# Patient Record
Sex: MALE | Race: AMERICAN INDIAN OR ALASKA NATIVE
[De-identification: names, ages, dates, MRNs, and addresses within clinical notes are randomized per-mention and may not be internally consistent; named-entity substitution may affect disease eponyms.]

---

## 2019-06-28 ENCOUNTER — HOSPITAL ENCOUNTER (INPATIENT)
Dept: HOSPITAL 5 - ED | Age: 69
LOS: 4 days | Discharge: HOME | DRG: 280 | End: 2019-07-02
Attending: INTERNAL MEDICINE | Admitting: INTERNAL MEDICINE
Payer: COMMERCIAL

## 2019-06-28 DIAGNOSIS — R91.8: ICD-10-CM

## 2019-06-28 DIAGNOSIS — Z71.6: ICD-10-CM

## 2019-06-28 DIAGNOSIS — I11.0: ICD-10-CM

## 2019-06-28 DIAGNOSIS — J96.00: ICD-10-CM

## 2019-06-28 DIAGNOSIS — I50.31: ICD-10-CM

## 2019-06-28 DIAGNOSIS — I25.10: ICD-10-CM

## 2019-06-28 DIAGNOSIS — I21.A1: Primary | ICD-10-CM

## 2019-06-28 DIAGNOSIS — L92.9: ICD-10-CM

## 2019-06-28 DIAGNOSIS — F17.213: ICD-10-CM

## 2019-06-28 DIAGNOSIS — I42.9: ICD-10-CM

## 2019-06-28 LAB
ALBUMIN SERPL-MCNC: 3.5 G/DL (ref 3.9–5)
ALT SERPL-CCNC: 16 UNITS/L (ref 7–56)
BASOPHILS # (AUTO): 0.1 K/MM3 (ref 0–0.1)
BASOPHILS NFR BLD AUTO: 0.7 % (ref 0–1.8)
BUN SERPL-MCNC: 13 MG/DL (ref 9–20)
BUN/CREAT SERPL: 13 %
CALCIUM SERPL-MCNC: 9.2 MG/DL (ref 8.4–10.2)
EOSINOPHIL # BLD AUTO: 0.1 K/MM3 (ref 0–0.4)
EOSINOPHIL NFR BLD AUTO: 0.7 % (ref 0–4.3)
HCT VFR BLD CALC: 36.1 % (ref 35.5–45.6)
HDLC SERPL-MCNC: 42 MG/DL (ref 40–59)
HEMOLYSIS INDEX: 4
HGB BLD-MCNC: 12.1 GM/DL (ref 11.8–15.2)
LYMPHOCYTES # BLD AUTO: 2.1 K/MM3 (ref 1.2–5.4)
LYMPHOCYTES NFR BLD AUTO: 22.8 % (ref 13.4–35)
MCHC RBC AUTO-ENTMCNC: 34 % (ref 32–34)
MCV RBC AUTO: 86 FL (ref 84–94)
MONOCYTES # (AUTO): 0.7 K/MM3 (ref 0–0.8)
MONOCYTES % (AUTO): 7.7 % (ref 0–7.3)
PLATELET # BLD: 483 K/MM3 (ref 140–440)
RBC # BLD AUTO: 4.2 M/MM3 (ref 3.65–5.03)

## 2019-06-28 PROCEDURE — 93005 ELECTROCARDIOGRAM TRACING: CPT

## 2019-06-28 PROCEDURE — 85610 PROTHROMBIN TIME: CPT

## 2019-06-28 PROCEDURE — 85520 HEPARIN ASSAY: CPT

## 2019-06-28 PROCEDURE — 85379 FIBRIN DEGRADATION QUANT: CPT

## 2019-06-28 PROCEDURE — 85049 AUTOMATED PLATELET COUNT: CPT

## 2019-06-28 PROCEDURE — 83880 ASSAY OF NATRIURETIC PEPTIDE: CPT

## 2019-06-28 PROCEDURE — 85014 HEMATOCRIT: CPT

## 2019-06-28 PROCEDURE — 71275 CT ANGIOGRAPHY CHEST: CPT

## 2019-06-28 PROCEDURE — 85027 COMPLETE CBC AUTOMATED: CPT

## 2019-06-28 PROCEDURE — 96374 THER/PROPH/DIAG INJ IV PUSH: CPT

## 2019-06-28 PROCEDURE — 85025 COMPLETE CBC W/AUTO DIFF WBC: CPT

## 2019-06-28 PROCEDURE — 84484 ASSAY OF TROPONIN QUANT: CPT

## 2019-06-28 PROCEDURE — 82550 ASSAY OF CK (CPK): CPT

## 2019-06-28 PROCEDURE — 80048 BASIC METABOLIC PNL TOTAL CA: CPT

## 2019-06-28 PROCEDURE — 80061 LIPID PANEL: CPT

## 2019-06-28 PROCEDURE — 71045 X-RAY EXAM CHEST 1 VIEW: CPT

## 2019-06-28 PROCEDURE — 96375 TX/PRO/DX INJ NEW DRUG ADDON: CPT

## 2019-06-28 PROCEDURE — 94760 N-INVAS EAR/PLS OXIMETRY 1: CPT

## 2019-06-28 PROCEDURE — 82553 CREATINE MB FRACTION: CPT

## 2019-06-28 PROCEDURE — 93010 ELECTROCARDIOGRAM REPORT: CPT

## 2019-06-28 PROCEDURE — 93970 EXTREMITY STUDY: CPT

## 2019-06-28 PROCEDURE — 85730 THROMBOPLASTIN TIME PARTIAL: CPT

## 2019-06-28 PROCEDURE — 80053 COMPREHEN METABOLIC PANEL: CPT

## 2019-06-28 PROCEDURE — 93306 TTE W/DOPPLER COMPLETE: CPT

## 2019-06-28 PROCEDURE — 36415 COLL VENOUS BLD VENIPUNCTURE: CPT

## 2019-06-28 PROCEDURE — 85018 HEMOGLOBIN: CPT

## 2019-06-28 NOTE — EMERGENCY DEPARTMENT REPORT
ED Shortness of Breath HPI





- General


Chief Complaint: Chest Pain


Stated Complaint: CHEST PAIN/SOB


Time Seen by Provider: 06/28/19 20:04


Source: family


Mode of arrival: Wheelchair


Limitations: Language Barrier





- History of Present Illness


Initial Comments: 





Mr. Olmstead is a 68 yo male with hx of HTN who presents with 2 months of dyspnea 

with exertion.  Recently arrived from [place] last night to visit his son.  He 

lives in [facility].  No leg pain.  +tobacco abuse.  Does not take medication for 

HTN.  He works as a farmer.  No chest pain currently  has mild nonspecific chest

pain transiently, too mild to describe.





No dyspnea at rest.  No cough.  


MD Complaint: shortness of breath


-: Gradual, month(s) (2)


Severity: mild


Consistency: constant


Improves With: rest


Worsens With: exertion


Known History Of: other (HTN o/w healthy)


Associated Symptoms: chest pain


Treatments Prior to Arrival: none





- Related Data


                                    Allergies











Allergy/AdvReac Type Severity Reaction Status Date / Time


 


No Known Allergies Allergy   Unverified 06/28/19 14:40














ED Review of Systems


ROS: 


Stated complaint: CHEST PAIN/SOB


Other details as noted in HPI





Comment: All other systems reviewed and negative


Constitutional: denies: fever, malaise


Respiratory: shortness of breath.  denies: cough


Cardiovascular: chest pain





ED Past Medical Hx





- Past Medical History


Previous Medical History?: Yes


Hx Hypertension: Yes





- Surgical History


Past Surgical History?: Yes


Additional Surgical History: hernia





- Social History


Smoking Status: Current Every Day Smoker





ED Physical Exam





- General


Limitations: Language Barrier


General appearance: alert, in no apparent distress





- Head


Head exam: Present: atraumatic, normocephalic





- Eye


Eye exam: Present: normal appearance





- ENT


ENT exam: Present: mucous membranes moist





- Neck


Neck exam: Present: normal inspection, full ROM





- Respiratory


Respiratory exam: Present: normal lung sounds bilaterally.  Absent: respiratory 

distress, wheezes, rales, rhonchi





- Cardiovascular


Cardiovascular Exam: Present: regular rate, normal rhythm, normal heart sounds. 

Absent: systolic murmur, diastolic murmur, rubs, gallop





- GI/Abdominal


GI/Abdominal exam: Present: soft, normal bowel sounds.  Absent: distended, 

tenderness, guarding, rebound





- Rectal


Rectal exam: Present: deferred





- Extremities Exam


Extremities exam: Present: normal inspection





- Back Exam


Back exam: Present: normal inspection





- Neurological Exam


Neurological exam: Present: alert, oriented X3





- Psychiatric


Psychiatric exam: Present: normal affect, normal mood





- Skin


Skin exam: Present: warm, dry, intact, normal color.  Absent: rash





ED Course


                                   Vital Signs











  06/28/19 06/28/19 06/28/19





  14:48 20:26 20:30


 


Temperature 98.1 F  


 


Pulse Rate 95 H 104 H 105 H


 


Respiratory 20 26 H 21





Rate   


 


Blood Pressure   174/124


 


Blood Pressure 150/104  





[Left]   


 


O2 Sat by Pulse 95  96





Oximetry   














  06/28/19 06/28/19 06/28/19





  20:39 20:45 20:59


 


Temperature   98.1 F


 


Pulse Rate 104 H 100 H 


 


Respiratory 24 21 





Rate   


 


Blood Pressure  168/116 


 


Blood Pressure 174/124  





[Left]   


 


O2 Sat by Pulse   





Oximetry   














ED Medical Decision Making





- Lab Data


Result diagrams: 


                                 06/28/19 20:38





                                 06/28/19 20:38





- EKG Data





06/28/19 20:56


EKG obtained 2048


Sinus tachycardia rate 100 beats a minute right axis deviation prolonged QT 

interval no significant ST elevation no acute ischemia 





- Radiology Data


Radiology results: report reviewed





AP portable chest: Perihilar basilar congestion, small bilateral effusion 

possible infiltrate in the left base according to radiology report and my 

personal interpretation





CT angiogram of the chest revealed no evidence of pulmonary embolism, positive 

atherosclerosis  of the coronary arteries positive hilar or mediastinal 

adenopathy, bilateral pleural effusions, pulmonary edema, soft tissue mass at 

the apex of the lung





- Medical Decision Making





1.  Shortness of breath especially with exertion for the past 2 months: Findings

 of acute congestive heart failure





2.  Elevated troponin reflective of cardiomyopathy, no current chest pain at 

this time.  Do not suspect unstable angina.  Will need further cardiac 

evaluation.





3.  Lung mass, will need further outpatient or inpatient treatment


Critical care attestation.: 


If time is entered above; I have spent that time in minutes in the direct care 

of this critically ill patient, excluding procedure time.








ED Disposition


Clinical Impression: 


 Acute congestive heart failure, Cardiomyopathy, Coronary artery disease, Lung 

mass





Disposition: DC-09 OP ADMIT IP TO THIS HOSP


Is pt being admited?: Yes


Does the pt Need Aspirin: No


Condition: Stable

## 2019-06-28 NOTE — XRAY REPORT
PROCEDURE: XR CHEST 1V AP 

 

TECHNIQUE:  Chest radiograph single view.  

 

HISTORY: Dyspnea 

 

COMPARISONS: None . 

 

FINDINGS: 

 

Heart: Borderline prominent in view of the projection. 

Mediastinum/Vessels: Aorta is unfolded, likely age-related. 

Lungs/Pleural space:  Perihilar vascular congestion is noted. Small bilateral pleural effusions are p
resent. Slightly greater consolidation in the left base is noted. I cannot exclude underlying infiltr
ate. 

Bony thorax: No acute osseous abnormality. 

Life support devices: None. 

 

IMPRESSION: Perihilar vascular congestion present bilaterally. Small bilateral effusions. Possible in
filtrate in the left base 

 

This document is electronically signed by Beulah Meeks MD., June 28 2019 09:35:53 PM ET

## 2019-06-28 NOTE — CAT SCAN REPORT
PROCEDURE: CT ANGIO CHEST 

 

TECHNIQUE:  Computerized tomographic angiography of the chest was performed during the IV injection o
f iodinated nonionic contrast including image processing.  The image data was postprocessed using 2-d
imensional multiplanar reformatted (MPR) and 3-dimensional (MIP and/or volume rendered) techniques. A
utomated exposure control, adjustment of mA and/or kV according to patient size, or iterative reconst
ruction dose optimization techniques were utilized.  

 

CT DOSE LENGTH PRODUCT:  363.7  mGycm 

 

HISTORY: tachycardia right bundle-branch block dyspnea . Evaluate pulmonary embolus 

 

COMPARISONS:  None . 

 

FINDINGS: 

 

Pulmonary out flow tract, right and left main pulmonary arteries and the approximal branches:  Clear,
 no filling defects seen to suggest pulmonary embolus. 

 

Pericardium: No evidence of pericardial effusion. 

 

 

Thoracic aorta:  No evidence of aneurysmal dilatation or dissection. 

 

 

Coronary arteries: Partially calcified indicating atherosclerotic disease. 

 

Mediastinum and hilar regions: Lymph nodes measuring up to 2.2 x 1.2 cm visualized in the right hilum
. Infracarinal adenopathy also appears to be present. This appears matted. Additional adenopathy prev
iously visualized posterior to the left mainstem bronchus measuring up to 1.4 cm in thickness. 

 

Lung Fields: Moderate size right pleural effusion present, small left pleural effusion is present. In
terstitial markings diffusely coarsened. There is patchy groundglass density in the perihilar regions
. The appearance suggests pulmonary edema/CHF. Some of the fluid on the right is tracking into the ma
tom fissure. 

 

There is a spiculated soft tissue mass in the apex of the right lung. This is partially calcified and
 measures 2.1 x 1.7 x 2.6 cm.. This extends to the right pleural surface at the apex of the right shavon
g. A smaller mildly spiculated mass visualized in the apex of the left lung measuring 1.8 x 1.5 cm. T
his appears to extends to the pleural surface near the apex of the right lung. 

 

Upper abdomen:  Contrast is seen refluxing into the inferior vena cava and intrahepatic veins.. This 
can be seen with tricuspid insufficiency and congestive failure. 

 

5 cm low-density nodule visualized in the renal cortex lower third of the left kidney suggesting a re
nal cortical cyst. 

 

Other: No acute bone abnormalities are identified. 

 

IMPRESSION: 

 

No evidence of pulmonary embolus. 

 

Atherosclerosis coronary arteries. 

 

Hilar and mediastinal adenopathy as described. 

 

Moderate size right pleural effusion present, small left pleural effusion present. 

 

Interstitial markings diffusely coarsened and there is patchy groundglass density in the perihilar re
gions. The appearance suggests pulmonary edema/CHF. 

 

Spiculated soft tissue mass near the apex of the right and left lung, the right is partially calcifie
d. These extend superiorly and are contiguous with the apical pleural surface. Pleural and parenchyma
l scarring could present this pattern however the nodular appearance is worrisome for malignancy. PET
 scan may be helpful to evaluate this patient further. 

 

 

 

 

This document is electronically signed by Archie Michelle MD., June 28 2019 11:05:57 PM ET

## 2019-06-29 LAB
APTT BLD: 33.7 SEC. (ref 24.2–36.6)
HCT VFR BLD CALC: 37.8 % (ref 35.5–45.6)
HGB BLD-MCNC: 12.7 GM/DL (ref 11.8–15.2)
INR PPP: 1.21 (ref 0.87–1.13)
PLATELET # BLD: 528 K/MM3 (ref 140–440)

## 2019-06-29 RX ADMIN — FUROSEMIDE SCH MG: 10 INJECTION, SOLUTION INTRAVENOUS at 09:33

## 2019-06-29 RX ADMIN — HEPARIN SODIUM SCH MLS/HR: 5000 INJECTION, SOLUTION INTRAVENOUS at 16:43

## 2019-06-29 RX ADMIN — CARVEDILOL SCH MG: 3.12 TABLET, FILM COATED ORAL at 09:34

## 2019-06-29 RX ADMIN — CARVEDILOL SCH MG: 3.12 TABLET, FILM COATED ORAL at 22:23

## 2019-06-29 RX ADMIN — NITROGLYCERIN SCH INCH: 20 OINTMENT TOPICAL at 09:35

## 2019-06-29 RX ADMIN — ASPIRIN SCH MG: 325 TABLET ORAL at 09:34

## 2019-06-29 RX ADMIN — NITROGLYCERIN SCH INCH: 20 OINTMENT TOPICAL at 05:30

## 2019-06-29 RX ADMIN — LISINOPRIL SCH MG: 5 TABLET ORAL at 09:34

## 2019-06-29 RX ADMIN — HEPARIN SODIUM SCH MLS/HR: 5000 INJECTION, SOLUTION INTRAVENOUS at 08:40

## 2019-06-29 RX ADMIN — NITROGLYCERIN SCH INCH: 20 OINTMENT TOPICAL at 18:38

## 2019-06-29 RX ADMIN — NITROGLYCERIN SCH INCH: 20 OINTMENT TOPICAL at 13:46

## 2019-06-29 NOTE — CONSULTATION
History of Present Illness


Consult date: 06/29/19


Requesting physician: HEMANTH PLUMMER


Consult reason: congestive heart failure, elevated troponin


History of present illness: 





Mr. Olmstead is a 68 y/o  with a documented history of hypertension

who presented to the ED with BRITTON and chest pain x2 months.  He recently arrived 

from [facility] to visit his son.  He takes no medication and reports no other 

medical history.  CTA of the chest was negative for PE and other acute findings.

 CXR significant for vascular congestion, small bilateral effusions and a pos

sible infiltrate in the left base. Troponins .32 and .273.  BNP also elevated to

8370.  EKG negative for acute findings. 





Past History


Past Medical History: hypertension





Medications and Allergies


                                    Allergies











Allergy/AdvReac Type Severity Reaction Status Date / Time


 


No Known Allergies Allergy   Unverified 06/28/19 14:40











Active Meds: 


Active Medications





Aspirin (Aspirin)  325 mg PO QDAY [facility]


   Last Admin: 06/29/19 09:34 Dose:  325 mg


   Documented by: 


Carvedilol (Coreg)  3.125 mg PO BID [facility]


   Last Admin: 06/29/19 09:34 Dose:  3.125 mg


   Documented by: 


Furosemide (Lasix)  40 mg IV QDAY [facility]


   Last Admin: 06/29/19 09:33 Dose:  40 mg


   Documented by: 


Hydralazine HCl (Apresoline)  10 mg IV Q4H PRN


   PRN Reason: Hypertension


Heparin Sodium/Sodium Chloride (Heparin/ 0.45% Nacl-25,000 Unit/500 Ml)  25,000 

unit in 500 mls @ 20 mls/hr IV TITRATE [facility]; Protocol


   Last Admin: 06/29/19 08:40 Dose:  1,000 units/hr, 20 mls/hr


   Documented by: 


Levalbuterol HCl (Xopenex)  0.63 mg IH Q8HRT PRN


   PRN Reason: Shortness Of Breath


Lisinopril (Zestril)  5 mg PO DAILY [facility]


   Last Admin: 06/29/19 09:34 Dose:  5 mg


   Documented by: 


Morphine Sulfate (Morphine)  2 mg IV Q6H PRN


   PRN Reason: Pain, Moderate (4-6)


Nitroglycerin (Nitro-Bid 2%)  0.5 inch TP QIDNTG [facility]; Protocol


   Last Admin: 06/29/19 09:35 Dose:  0.5 inch


   Documented by: 


Ondansetron HCl (Zofran)  4 mg IV Q8H PRN


   PRN Reason: Nausea And Vomiting











Review of Systems


ROS unobtainable: due to mental status (Patient does not speak English.  No 

 or family available. )


Cardiovascular: chest pain





Physical Examination


                                   Vital Signs











Temp Pulse Resp BP Pulse Ox


 


 98.1 F   95 H  20   150/104   95 


 


 06/28/19 14:48  06/28/19 14:48  06/28/19 14:48  06/28/19 14:48  06/28/19 14:48











General appearance: no acute distress


HEENT: Positive: PERRL


Neck: Positive: neck supple


Cardiac: Positive: Reg Rate and Rhythm


Lungs: Positive: clear to auscultation, Normal Breath Sounds, Decreased Breath 

Sounds (Diminished on left)


Neuro: Positive: Other (Deferred)


Abdomen: Positive: Unremarkable


Male genitourinary: Positive: deferred


Skin: Positive: Clear


Musculoskeletal: Normal Range of Motion


Extremities: Present: normal





Results





                                 06/29/19 07:43





                                 06/28/19 20:38


                                 Cardiac Enzymes











  06/28/19 06/29/19 Range/Units





  20:38 05:16 


 


AST  24   (5-40)  units/L


 


CK-MB (CK-2)   9.2 H  (0.0-4.0)  ng/mL








                                   Coagulation











  06/29/19 Range/Units





  07:43 


 


PT  15.0 H  (12.2-14.9)  Sec.


 


INR  1.21 H  (0.87-1.13)  


 


APTT  33.7  (24.2-36.6)  Sec.








                                     Lipids











  06/28/19 Range/Units





  20:38 


 


Triglycerides  82  (2-149)  mg/dL


 


Cholesterol  150  ()  mg/dL


 


HDL Cholesterol  42  (40-59)  mg/dL


 


Cholesterol/HDL Ratio  3.57  %








                                       CBC











  06/28/19 06/29/19 Range/Units





  20:38 07:43 


 


WBC  9.2   (4.5-11.0)  K/mm3


 


RBC  4.20   (3.65-5.03)  M/mm3


 


Hgb  12.1  12.7  (11.8-15.2)  gm/dl


 


Hct  36.1  37.8  (35.5-45.6)  %


 


Plt Count  483 H  528 H  (140-440)  K/mm3


 


Lymph #  2.1   (1.2-5.4)  K/mm3


 


Mono #  0.7   (0.0-0.8)  K/mm3


 


Eos #  0.1   (0.0-0.4)  K/mm3


 


Baso #  0.1   (0.0-0.1)  K/mm3








                          Comprehensive Metabolic Panel











  06/28/19 Range/Units





  20:38 


 


Sodium  137  (137-145)  mmol/L


 


Potassium  4.0  (3.6-5.0)  mmol/L


 


Chloride  97.6 L  ()  mmol/L


 


Carbon Dioxide  24  (22-30)  mmol/L


 


BUN  13  (9-20)  mg/dL


 


Creatinine  1.0  (0.8-1.5)  mg/dL


 


Glucose  99  ()  mg/dL


 


Calcium  9.2  (8.4-10.2)  mg/dL


 


AST  24  (5-40)  units/L


 


ALT  16  (7-56)  units/L


 


Alkaline Phosphatase  77  ()  units/L


 


Total Protein  7.4  (6.3-8.2)  g/dL


 


Albumin  3.5 L  (3.9-5)  g/dL














- Imaging and Cardiology


Echo: pending





EKG interpretations





- Telemetry


EKG Rhythm: Sinus Rhythm





- EKG


Supraventricular dysrhythmia: atrial premature complexe


AV and intraventricular conduction: left bundle branch block, left posterior 

fascicular


Repolarization changes or abnormalities: nonspecific abnormality, ST segment, 

and/or T wave





Assessment and Plan





Chest pain of unclear etiology at this point.  Will obtain echocardiogram, which

will guide further recommendations.  Agree with current cardiac management, 

which includes nitroglycerin, carvedilol, aspirin, Lasix and lisinopril.  





Patient has been seen in conjunction with Dr. Cr, who agrees with 

assessment and plan.











- Patient Problems


(1) NSTEMI (non-ST elevated myocardial infarction)


Current Visit: Yes   Status: Acute   





(2) Hypertension


Current Visit: Yes   Status: Chronic   





(3) Acute congestive heart failure


Current Visit: Yes   Status: Suspected   





(4) Cardiomyopathy


Current Visit: Yes   Status: Suspected   





(5) Coronary artery disease


Current Visit: Yes   Status: Suspected   





(6) Lung mass


Current Visit: Yes   Status: Suspected

## 2019-06-29 NOTE — VASCULAR LAB REPORT
PROCEDURE:  VL VENOUS DUPLEX LE BILAT 

 

TECHNIQUE:  Grayscale, color flow and spectral waveform images were obtained of bilateral lower extre
mities. 

 

HISTORY:  dvt 

 

COMPARISON: None 

 

FINDINGS: 

 

There is no deep venous thrombosis seen in the left or right lower extremity. 

Flow is demonstrated by color flow and spectral waveform imaging. 

There is appropriate wall compression and augmentation. 

 

 

 

IMPRESSION: 

There is no evidence for DVT in either right or left lower extremity. 

 

This document is electronically signed by Rupa Caballero MD., June 29 2019 02:53:42 PM ET

## 2019-06-29 NOTE — HISTORY AND PHYSICAL REPORT
CHIEF COMPLAINT:  Shortness of breath.



HISTORY OF PRESENT ILLNESS:  The patient is a 69-year-old who presented to the

Emergency Room with history of shortness of breath going on for about 2 months. 

The patient recently came back from a trip to [place] after visiting his son who

lives in [facility] and states that he hardly walked about 5 to 6 steps without

being so short of breath.  Also, the patient complains of weakness whenever he

does a little activity.  The patient denied history of fever or chills and

denied history of cough.  Also, the patient denied history of swelling in the

ankles and the patient has history of chest pain and denied history of nausea or

vomiting.



PAST MEDICAL HISTORY:  Pertinent for hypertension.



PAST SURGICAL HISTORY:  Pertinent for hernia repair.



FAMILY HISTORY:  Reviewed and noncontributory.



SOCIAL HISTORY:  The patient smokes cigarettes, does not use illicit, and it is

not clear whether the patient drinks alcohol because the patient is a poor

historian and has communication problems.



MEDICATIONS:  The patient's home medications are not known.



ALLERGIES:  There are no known drug allergies.



REVIEW OF SYSTEMS:

CONSTITUTIONAL:  There is no fever, no chills, no diaphoresis.

HEENT:  There is no headache or sore throat.

CARDIOVASCULAR SYSTEM:  There is no chest pain.  There is no orthopnea.

RESPIRATORY SYSTEM:  Shortness of breath is present.  There is no cough.

GASTROINTESTINAL SYSTEM:  There is no nausea, no vomiting, no abdominal pain,

diarrhea or constipation.

NEUROLOGICAL SYSTEM:  There is generalized weakness, but no numbness, no

dizziness, and no altered mental status.

MUSCULOSKELETAL SYSTEM:  There is no joint pain or swelling.

DERMATOLOGICAL SYSTEM:  There is no skin rash or itching.

GENITOURINARY SYSTEM:  There is no dysuria, hematuria, or flank pain.

Rest of system review is normal.



PHYSICAL EXAMINATION:

GENERAL:  At the time of exam, the patient was found to be alert, oriented x 3,

in mild distress due to shortness of breath.

VITAL SIGNS:  Shows temperature of 98.1 degrees fahrenheit, pulse of 100,

respirations 21, blood pressure was 168/116 and O2 sat of 96% on room air.

HEENT:  Showed pupils to be equal, round, reactive to light and accommodation. 

Extraocular muscles are intact.

NECK:  Supple with no JVD or carotid bruit.

CARDIOVASCULAR SYSTEM:  Showed normal first and second heart sounds with no

gallops or murmurs.

RESPIRATORY SYSTEM:  Show good air entry on both sides of the lung with

bilateral scattered crackles.

GASTROINTESTINAL SYSTEM:  Show abdomen to be full, soft, and nontender with no

organomegaly or rigidity.

NEUROLOGICAL:  Shows no focal deficit.

MUSCULOSKELETAL SYSTEM:  Show no joint swelling or tenderness.

DERMATOLOGICAL SYSTEM:  Show no skin rash.

GENITOURINARY SYSTEM:  Showing no costovertebral angle tenderness.



PERTINENT LABORATORY AND IMAGING STUDIES:  The patient has the following lab

tests done.  CBC shows normal white count, normal hemoglobin, and normal

hematocrit with CBC differential being showing elevated monocyte count of 7.7%. 

The patient's D-dimer was elevated with a value of 868.  The patient's chemistry

was unremarkable except for elevated brain natriuretic peptide level of 8,832. 

The patient's cardiac enzymes show elevated troponin level of 0.321.



IMAGING STUDIES:  The patient had chest x-ray done that shows perihilar vascular

congestion bilaterally, small bilateral pleural effusion and possible infiltrate

in the left base of the lung.  The patient had CT angiogram of the chest done

because of elevated D-dimer and shortness of breath.  The CT angiogram shows no

evidence of pulmonary embolism.  There is finding of atherosclerotic coronary

arteries.  There is also finding of hilar and mediastinal adenopathy, moderate

sized right pleural effusion as well as small left pleural effusion were found. 

There is finding of interstitial marking diffusely ____ and there is patchy

ground glass density in the perihilar regions.  Radiology said that appearance

suggest pulmonary edema/congestive heart failure.  There is also finding of

spiculated soft tissue mass near the apex of the right and left lung and the

right spiculated mass is partially calcified.  The radiologist said that these

extends superiorly and that contiguous with the apical pleural surface.  The

report also shows that pleural and parenchymal scarring present these patterns,

however, radiologist is going further to say that nodular appearance is

worrisome for malignancy and suggested that PET scan may be helpful to evaluate

this patient.



DIAGNOSES

1.  Bilateral pleural effusion.

2.  Bilateral lung mass.

3.  Congestive heart failure.

4.  NSTEMI.

5.  Hypertension.



PLAN OF CARE:

1.  The patient will be admitted as inpatient to telemetry.

2.  The patient will have cardiac enzyme involving troponin, total CK and CK-MB

checked every 6 hours x 2 more levels.

3.  The patient will have Pulmonary consult with Dr.Reyes -Beltram for bilateral
lung

mass suspicious for malignancy and also bilateral pleural effusion.

4.  The patient will have cardiology consult with Dr. Bowling because of

NSTEMI  with congestive heart failure.

5.  The patient will be on aspirin 325 mg by mouth daily and will be on

carvedilol 3.125 mg twice daily for treatment of congestive heart failure.

6.  The patient will be on Lasix 40 mg IV daily and will be on nitro paste half

inch to anterior chest q.i.d.

7.  The patient will be on Xopenex breathing treatment every 8 hours as needed

for shortness of breath and will be on IV Zofran 4 mg every 8 hours for nausea

and vomiting as well as aspirin 325 mg by mouth daily.

8.  The patient will be on heparin 5000 units subcutaneous q. 12 hours for DVT

prophylaxis.

9.  The patient will have 2D echo done in the morning.

10.  The patient's diet will be 2 g sodium diet.

11.  The patient's blood pressure will be monitored with nitro paste and

lisinopril.  If the patient's blood pressure continues to be high, the patient

will be started on hydralazine as needed for elevated blood pressure.

12. Patient will be on I>V heparin per protocol for NSTEMI.





DD: 06/29/2019 01:29

DT: 06/29/2019 02:32

JOB# [id number]  [id number]

OCN/RIAN PALUMBOD

## 2019-06-29 NOTE — EVENT NOTE
Date: 06/29/19





Full consult to follow. Reviewed CT imaging.  Right upper lobe mass has calcium 

in it.  It is spiculated in nature and located in the upper lobe.  There is a 

smaller one located on the left upper lobe as well.  Adenopathy could be 

reactive or engorgement from pulmonary edema which is present.  Also bilateral 

effusions right >left.  Suggest once diuresed to order a CT guided biopsy of the

right upper lobe mass vs right sided thoracentesis but patient will need to be 

optimized volume wise first to have biopsy if needed.  Will discuss with family 

when I see them.

## 2019-06-29 NOTE — PROGRESS NOTE
Hospitalist Physical





- Constitutional


Vitals: 


                                        











Temp Pulse Resp BP Pulse Ox


 


 98.0 F   98 H  24   141/98   97 


 


 06/29/19 07:22  06/29/19 09:35  06/29/19 07:22  06/29/19 09:35  06/29/19 07:22














Results





- Labs


CBC & Chem 7: 


                                 06/29/19 07:43





                                 06/28/19 20:38


Labs: 


                             Laboratory Last Values











WBC  9.2 K/mm3 (4.5-11.0)   06/28/19  20:38    


 


RBC  4.20 M/mm3 (3.65-5.03)   06/28/19  20:38    


 


Hgb  12.7 gm/dl (11.8-15.2)   06/29/19  07:43    


 


Hct  37.8 % (35.5-45.6)   06/29/19  07:43    


 


MCV  86 fl (84-94)   06/28/19  20:38    


 


MCH  29 pg (28-32)   06/28/19  20:38    


 


MCHC  34 % (32-34)   06/28/19  20:38    


 


RDW  17.2 % (13.2-15.2)  H  06/28/19  20:38    


 


Plt Count  528 K/mm3 (140-440)  H  06/29/19  07:43    


 


Lymph % (Auto)  22.8 % (13.4-35.0)   06/28/19  20:38    


 


Mono % (Auto)  7.7 % (0.0-7.3)  H  06/28/19  20:38    


 


Eos % (Auto)  0.7 % (0.0-4.3)   06/28/19  20:38    


 


Baso % (Auto)  0.7 % (0.0-1.8)   06/28/19  20:38    


 


Lymph #  2.1 K/mm3 (1.2-5.4)   06/28/19  20:38    


 


Mono #  0.7 K/mm3 (0.0-0.8)   06/28/19  20:38    


 


Eos #  0.1 K/mm3 (0.0-0.4)   06/28/19  20:38    


 


Baso #  0.1 K/mm3 (0.0-0.1)   06/28/19  20:38    


 


Seg Neutrophils %  68.1 % (40.0-70.0)   06/28/19  20:38    


 


Seg Neutrophils #  6.2 K/mm3 (1.8-7.7)   06/28/19  20:38    


 


PT  15.0 Sec. (12.2-14.9)  H  06/29/19  07:43    


 


INR  1.21  (0.87-1.13)  H  06/29/19  07:43    


 


APTT  33.7 Sec. (24.2-36.6)   06/29/19  07:43    


 


  868.67 ng/mlDDU (0-234)  H  06/28/19  20:38    


 


Sodium  137 mmol/L (137-145)   06/28/19  20:38    


 


Potassium  4.0 mmol/L (3.6-5.0)   06/28/19  20:38    


 


Chloride  97.6 mmol/L ()  L  06/28/19  20:38    


 


Carbon Dioxide  24 mmol/L (22-30)   06/28/19  20:38    


 


  19 mmol/L  06/28/19  20:38    


 


BUN  13 mg/dL (9-20)   06/28/19  20:38    


 


  1.0 mg/dL (0.8-1.5)   06/28/19  20:38    


 


Estimated GFR  > 60 ml/min  06/28/19  20:38    


 


  13 %  06/28/19  20:38    


 


Glucose  99 mg/dL ()   06/28/19  20:38    


 


Calcium  9.2 mg/dL (8.4-10.2)   06/28/19  20:38    


 


  0.90 mg/dL (0.1-1.2)   06/28/19  20:38    


 


AST  24 units/L (5-40)   06/28/19  20:38    


 


ALT  16 units/L (7-56)   06/28/19  20:38    


 


  77 units/L ()   06/28/19  20:38    


 


  336 units/L ()  H  06/29/19  05:16    


 


CK-MB (CK-2)  9.2 ng/mL (0.0-4.0)  H  06/29/19  05:16    


 


CK-MB (CK-2) Rel Index  2.7  (0-4)   06/29/19  05:16    


 


  0.273 ng/mL (0.00-0.029)  H*  06/29/19  05:16    


 


NT-Pro-B Natriuret Pep  8832 pg/mL (0-900)  H  06/28/19  20:38    


 


  7.4 g/dL (6.3-8.2)   06/28/19  20:38    


 


  3.5 g/dL (3.9-5)  L  06/28/19  20:38    


 


  0.9 %  06/28/19  20:38    


 


Triglycerides  82 mg/dL (2-149)   06/28/19  20:38    


 


Cholesterol  150 mg/dL ()   06/28/19  20:38    


 


  101 mg/dL ()   06/28/19  20:38    


 


  42 mg/dL (40-59)   06/28/19  20:38    


 


  3.57 %  06/28/19  20:38    














Active Medications





- Current Medications


Current Medications: 














Generic Name Dose Route Start Last Admin





  Trade Name Freq  PRN Reason Stop Dose Admin


 


Aspirin  325 mg  06/29/19 10:00  06/29/19 09:34





  Aspirin  PO   325 mg





  QDAY JOSEFA   Administration


 


Carvedilol  3.125 mg  06/29/19 10:00  06/29/19 09:34





  Coreg  PO   3.125 mg





  BID JOSEFA   Administration


 


Furosemide  40 mg  06/29/19 10:00  06/29/19 09:33





  Lasix  IV   40 mg





  QDAY JOSEFA   Administration


 


Hydralazine HCl  10 mg  06/29/19 07:59 





  Apresoline  IV  





  Q4H PRN  





  Hypertension  


 


Heparin Sodium/Sodium Chloride  25,000 unit in 500 mls @ 20 mls/hr  06/29/19 

08:00  06/29/19 08:40





  Heparin/ 0.45% Nacl-25,000 Unit/500 Ml  IV   1,000 units/hr





  TITRATE JOSEFA   20 mls/hr





    Administration





  Protocol  





  1,000 UNITS/HR  


 


Levalbuterol HCl  0.63 mg  06/29/19 00:33 





  Xopenex  IH  





  Q8HRT PRN  





  Shortness Of Breath  


 


Lisinopril  5 mg  06/29/19 10:00  06/29/19 09:34





  Zestril  PO   5 mg





  DAILY JOSEFA   Administration


 


Nitroglycerin  0.5 inch  06/29/19 06:00  06/29/19 09:35





  Nitro-Bid 2%  TP   0.5 inch





  QIDNTG JOSEFA   Administration





  Protocol  


 


Ondansetron HCl  4 mg  06/29/19 00:27 





  Zofran  IV  





  Q8H PRN  





  Nausea And Vomiting

## 2019-06-30 LAB
BUN SERPL-MCNC: 22 MG/DL (ref 9–20)
BUN/CREAT SERPL: 17 %
CALCIUM SERPL-MCNC: 8.2 MG/DL (ref 8.4–10.2)
HCT VFR BLD CALC: 33.8 % (ref 35.5–45.6)
HEMOLYSIS INDEX: 2
HGB BLD-MCNC: 11.2 GM/DL (ref 11.8–15.2)
MCHC RBC AUTO-ENTMCNC: 33 % (ref 32–34)
MCV RBC AUTO: 86 FL (ref 84–94)
PLATELET # BLD: 485 K/MM3 (ref 140–440)
RBC # BLD AUTO: 3.91 M/MM3 (ref 3.65–5.03)

## 2019-06-30 RX ADMIN — CARVEDILOL SCH MG: 3.12 TABLET, FILM COATED ORAL at 22:31

## 2019-06-30 RX ADMIN — ASPIRIN SCH MG: 325 TABLET ORAL at 11:00

## 2019-06-30 RX ADMIN — NITROGLYCERIN SCH INCH: 20 OINTMENT TOPICAL at 14:12

## 2019-06-30 RX ADMIN — NITROGLYCERIN SCH: 20 OINTMENT TOPICAL at 05:51

## 2019-06-30 RX ADMIN — CARVEDILOL SCH MG: 3.12 TABLET, FILM COATED ORAL at 11:01

## 2019-06-30 RX ADMIN — FUROSEMIDE SCH MG: 10 INJECTION, SOLUTION INTRAVENOUS at 11:01

## 2019-06-30 RX ADMIN — NITROGLYCERIN SCH: 20 OINTMENT TOPICAL at 18:17

## 2019-06-30 RX ADMIN — LISINOPRIL SCH MG: 5 TABLET ORAL at 11:00

## 2019-06-30 RX ADMIN — NITROGLYCERIN SCH INCH: 20 OINTMENT TOPICAL at 11:02

## 2019-06-30 RX ADMIN — HEPARIN SODIUM SCH MLS/HR: 5000 INJECTION, SOLUTION INTRAVENOUS at 11:45

## 2019-06-30 RX ADMIN — HEPARIN SODIUM SCH MLS/HR: 5000 INJECTION, SOLUTION INTRAVENOUS at 07:45

## 2019-06-30 RX ADMIN — NITROGLYCERIN SCH: 20 OINTMENT TOPICAL at 14:15

## 2019-06-30 NOTE — PROGRESS NOTE
Assessment and Plan





Patient is a 68 y/o  who recently arrived from [place] to visit 

his son.  His only reported history is hypertension, which was untreated.  He 

c/o CP with positive troponins and EKG negative for STEMI.  Chest pain of 

unclear etiology at this point.  CT of chest found RUL mass - pulmonology 

following and recommendations noted.  Will obtain echocardiogram, which will 

guide further recommendations.  Agree with current cardiac management, which 

includes nitroglycerin, carvedilol, aspirin, Lasix and lisinopril.  





Patient has been seen in conjunction with Dr. Cr, who agrees with 

assessment and plan.











- Patient Problems


(1) NSTEMI (non-ST elevated myocardial infarction)


Current Visit: Yes   Status: Acute   





(2) Hypertension


Current Visit: Yes   Status: Chronic   





(3) Acute congestive heart failure


Current Visit: Yes   Status: Suspected   





(4) Cardiomyopathy


Current Visit: Yes   Status: Suspected   





(5) Coronary artery disease


Current Visit: Yes   Status: Suspected   





(6) Lung mass


Current Visit: Yes   Status: Acute   





Subjective


Date of service: 06/30/19


Interval history: 





Patient is lying in bed eating breakfast.  Reports that his chest pain is 

minimal.  Only 300 mL of UOP documented, but patient states he is voiding "a 

lot."  


SR in 60s on telemetry.  





Objective


                                        


                                Last Vital Signs











Temp  98.4 F   06/30/19 07:42


 


Pulse  86   06/30/19 07:42


 


Resp  24   06/30/19 07:42


 


BP  121/93   06/30/19 07:42


 


Pulse Ox  94   06/30/19 07:42














- Physical Examination


HEENT: Positive: PERRL


Neck: Positive: neck supple


Cardiac: Positive: Reg Rate and Rhythm


Lungs: Positive: Normal Exam


Neuro: Positive: Grossly Intact, Other (Deferred)


Abdomen: Positive: Unremarkable


/Rectal: Other (Deferred)


Skin: Positive: Clear


Musculoskeletal: Normal Range of Motion


Extremities: Present: normal





- Labs and Meds


                                 Cardiac Enzymes











  06/29/19 Range/Units





  14:40 


 


CK-MB (CK-2)  8.5 H  (0.0-4.0)  ng/mL








                                       CBC











  06/30/19 Range/Units





  06:54 


 


WBC  7.9  (4.5-11.0)  K/mm3


 


RBC  3.91  (3.65-5.03)  M/mm3


 


Hgb  11.2 L  (11.8-15.2)  gm/dl


 


Hct  33.8 L  (35.5-45.6)  %


 


Plt Count  485 H  (140-440)  K/mm3








                          Comprehensive Metabolic Panel











  06/30/19 Range/Units





  06:54 


 


Sodium  138  (137-145)  mmol/L


 


Potassium  3.6  (3.6-5.0)  mmol/L


 


Chloride  96.5 L  ()  mmol/L


 


Carbon Dioxide  25  (22-30)  mmol/L


 


BUN  22 H  (9-20)  mg/dL


 


Creatinine  1.3  (0.8-1.5)  mg/dL


 


Glucose  76  ()  mg/dL


 


Calcium  8.2 L  (8.4-10.2)  mg/dL














- Imaging and Cardiology


Echo: pending





- Telemetry


EKG Rhythm: Sinus Rhythm


AV and intraventricular conduction: left bundle branch block, left posterior 

fascicular


Repolarization changes or abnormalities: nonspecific abnormality, ST segment, 

and/or T wave

## 2019-06-30 NOTE — PROGRESS NOTE
Assessment and Plan


Assessment and plan: 


Patient is a 69 year old male with hx of HTN, Tobacco dependance and a Palomares, 

presenting to the ED with complaints of 2 months of dyspnea with exertion with 

mild non specific chest pain 3/10. Patient recently arrived from [place] ([facility])

the day prior to admission. 





CTA of the chest was negative for PE but shwed a Right upper lobe mass and also 

another on the left,  pleural effusion, 


CXR significant for vascular congestion, small bilateral effusions and a 

possible infiltrate in the left base.


EKG negative for acute findings. 





Assessment and plan





NSTEMI: Continue work up and management per cardiology, Heparin drip, 

nitroglycerin, carvedilol, aspirin, Lasix and lisinopril.  Echo Pending


Acute Respiratory failure: Improving with diuresis, Pulmonary input noted


HTN;


Acute Congestive Heart failure: Await Echo, CHF protocol, daily weight, low salt

diet. 


Lung lesion: per Pulm, once volume optimized will plan CT guided biopsy and 

possible thoracentesis also 


Tobacco use disorder: extensive counselling on tobacco cessation


No family present at this time. 














History


Interval history: 


Patient seen and examined reports improvement in symptoms. No new complaints, He

denies hx of TB. 








Hospitalist Physical





- Constitutional


Vitals: 


                                        











Temp Pulse Resp BP Pulse Ox


 


 98.4 F   86   24   121/93   94 


 


 06/30/19 07:42  06/30/19 07:42  06/30/19 07:42  06/30/19 07:42  06/30/19 07:42











General appearance: Present: no acute distress, well-nourished





- EENT


Eyes: Present: PERRL, EOM intact


ENT: hearing intact





- Neck


Neck: Present: supple, normal ROM





- Respiratory


Respiratory effort: normal


Respiratory: bilateral: diminished





- Cardiovascular


Rhythm: regular


Heart Sounds: Present: S1 & S2, systolic murmur





- Extremities


Extremities: no ischemia, pulses intact, pulses symmetrical, normal temperature,

Full ROM


Extremity abnormal: edema (trace)


Peripheral Pulses: within normal limits





- Abdominal


General gastrointestinal: soft, non-tender, non-distended, normal bowel sounds





- Integumentary


Integumentary: Present: clear, warm, dry





- Psychiatric


Psychiatric: appropriate mood/affect, intact judgment & insight





- Neurologic


Neurologic: CNII-XII intact, moves all extremities





- Allied Health


Allied health notes reviewed: nursing





Results





- Labs


CBC & Chem 7: 


                                 07/01/19 04:29





                                 06/30/19 06:54


Labs: 


                             Laboratory Last Values











WBC  7.9 K/mm3 (4.5-11.0)   06/30/19  06:54    


 


RBC  3.91 M/mm3 (3.65-5.03)   06/30/19  06:54    


 


Hgb  11.2 gm/dl (11.8-15.2)  L  06/30/19  06:54    


 


Hct  33.8 % (35.5-45.6)  L  06/30/19  06:54    


 


MCV  86 fl (84-94)   06/30/19  06:54    


 


MCH  29 pg (28-32)   06/30/19  06:54    


 


MCHC  33 % (32-34)   06/30/19  06:54    


 


RDW  17.0 % (13.2-15.2)  H  06/30/19  06:54    


 


Plt Count  485 K/mm3 (140-440)  H  06/30/19  06:54    


 


Lymph % (Auto)  22.8 % (13.4-35.0)   06/28/19  20:38    


 


Mono % (Auto)  7.7 % (0.0-7.3)  H  06/28/19  20:38    


 


Eos % (Auto)  0.7 % (0.0-4.3)   06/28/19  20:38    


 


Baso % (Auto)  0.7 % (0.0-1.8)   06/28/19  20:38    


 


Lymph #  2.1 K/mm3 (1.2-5.4)   06/28/19  20:38    


 


Mono #  0.7 K/mm3 (0.0-0.8)   06/28/19  20:38    


 


Eos #  0.1 K/mm3 (0.0-0.4)   06/28/19  20:38    


 


Baso #  0.1 K/mm3 (0.0-0.1)   06/28/19  20:38    


 


Seg Neutrophils %  68.1 % (40.0-70.0)   06/28/19  20:38    


 


Seg Neutrophils #  6.2 K/mm3 (1.8-7.7)   06/28/19  20:38    


 


PT  15.0 Sec. (12.2-14.9)  H  06/29/19  07:43    


 


INR  1.21  (0.87-1.13)  H  06/29/19  07:43    


 


APTT  33.7 Sec. (24.2-36.6)   06/29/19  07:43    


 


  868.67 ng/mlDDU (0-234)  H  06/28/19  20:38    


 


Heparin Anti-Xa Level  0.35 U.I./ml (0.3-0.7)   06/30/19  06:54    


 


Sodium  137 mmol/L (137-145)   06/28/19  20:38    


 


Potassium  4.0 mmol/L (3.6-5.0)   06/28/19  20:38    


 


Chloride  97.6 mmol/L ()  L  06/28/19  20:38    


 


Carbon Dioxide  24 mmol/L (22-30)   06/28/19  20:38    


 


  19 mmol/L  06/28/19  20:38    


 


BUN  13 mg/dL (9-20)   06/28/19  20:38    


 


  1.0 mg/dL (0.8-1.5)   06/28/19  20:38    


 


Estimated GFR  > 60 ml/min  06/28/19  20:38    


 


  13 %  06/28/19  20:38    


 


Glucose  99 mg/dL ()   06/28/19  20:38    


 


Calcium  9.2 mg/dL (8.4-10.2)   06/28/19  20:38    


 


  0.90 mg/dL (0.1-1.2)   06/28/19  20:38    


 


AST  24 units/L (5-40)   06/28/19  20:38    


 


ALT  16 units/L (7-56)   06/28/19  20:38    


 


  77 units/L ()   06/28/19  20:38    


 


  185 units/L ()  H  06/29/19  14:40    


 


CK-MB (CK-2)  8.5 ng/mL (0.0-4.0)  H  06/29/19  14:40    


 


CK-MB (CK-2) Rel Index  4.5  (0-4)  H  06/29/19  14:40    


 


  0.329 ng/mL (0.00-0.029)  H* D 06/29/19  14:40    


 


NT-Pro-B Natriuret Pep  8832 pg/mL (0-900)  H  06/28/19  20:38    


 


  7.4 g/dL (6.3-8.2)   06/28/19  20:38    


 


  3.5 g/dL (3.9-5)  L  06/28/19  20:38    


 


  0.9 %  06/28/19  20:38    


 


Triglycerides  82 mg/dL (2-149)   06/28/19  20:38    


 


Cholesterol  150 mg/dL ()   06/28/19  20:38    


 


  101 mg/dL ()   06/28/19  20:38    


 


  42 mg/dL (40-59)   06/28/19  20:38    


 


  3.57 %  06/28/19  20:38    














Active Medications





- Current Medications


Current Medications: 














Generic Name Dose Route Start Last Admin





  Trade Name Freq  PRN Reason Stop Dose Admin


 


Aspirin  325 mg  06/29/19 10:00  06/29/19 09:34





  Aspirin  PO   325 mg





  QDAY [facility]   Administration


 


Carvedilol  3.125 mg  06/29/19 10:00  06/29/19 22:23





  Coreg  PO   3.125 mg





  BID JOSEFA   Administration


 


Furosemide  40 mg  06/29/19 10:00  06/29/19 09:33





  Lasix  IV   40 mg





  QDAY [facility]   Administration


 


Hydralazine HCl  10 mg  06/29/19 07:59 





  Apresoline  IV  





  Q4H PRN  





  Hypertension  


 


Heparin Sodium/Sodium Chloride  25,000 unit in 500 mls @ 20 mls/hr  06/29/19 

08:00  06/30/19 07:45





  Heparin/ 0.45% Nacl-25,000 Unit/500 Ml  IV   1,100 units/hr





  TITRATE JOSEFA   22 mls/hr





    Administration





  Protocol  





  1,000 UNITS/HR  


 


Levalbuterol HCl  0.63 mg  06/29/19 00:33 





  Xopenex  IH  





  Q8HRT PRN  





  Shortness Of Breath  


 


Lisinopril  5 mg  06/29/19 10:00  06/29/19 09:34





  Zestril  PO   5 mg





  DAILY [facility]   Administration


 


Morphine Sulfate  2 mg  06/29/19 09:44 





  Morphine  IV  





  Q6H PRN  





  Pain, Moderate (4-6)  


 


Nitroglycerin  0.5 inch  06/29/19 06:00  06/30/19 05:51





  Nitro-Bid 2%  TP   Not Given





  QIDNTG [facility]  





  Protocol  


 


Ondansetron HCl  4 mg  06/29/19 00:27 





  Zofran  IV  





  Q8H PRN  





  Nausea And Vomiting

## 2019-07-01 LAB
HCT VFR BLD CALC: 32.9 % (ref 35.5–45.6)
HGB BLD-MCNC: 10.9 GM/DL (ref 11.8–15.2)
PLATELET # BLD: 507 K/MM3 (ref 140–440)

## 2019-07-01 RX ADMIN — LISINOPRIL SCH MG: 5 TABLET ORAL at 09:33

## 2019-07-01 RX ADMIN — NITROGLYCERIN SCH: 20 OINTMENT TOPICAL at 09:41

## 2019-07-01 RX ADMIN — ASPIRIN SCH MG: 325 TABLET ORAL at 09:33

## 2019-07-01 RX ADMIN — NITROGLYCERIN SCH: 20 OINTMENT TOPICAL at 06:40

## 2019-07-01 RX ADMIN — NITROGLYCERIN SCH: 20 OINTMENT TOPICAL at 17:01

## 2019-07-01 RX ADMIN — CARVEDILOL SCH MG: 3.12 TABLET, FILM COATED ORAL at 22:08

## 2019-07-01 RX ADMIN — CARVEDILOL SCH MG: 3.12 TABLET, FILM COATED ORAL at 09:33

## 2019-07-01 RX ADMIN — NITROGLYCERIN SCH: 20 OINTMENT TOPICAL at 18:55

## 2019-07-01 RX ADMIN — FUROSEMIDE SCH MG: 10 INJECTION, SOLUTION INTRAVENOUS at 09:33

## 2019-07-01 NOTE — EVENT NOTE
Date: 07/01/19





Spoke with primary physician here in hospital who spoke with family.  No prior 

history or exposure to TB.  Family and patient agreeable to CT guided biopsy.  

At this point, only other pulmonary recommendation is to wean oxygen therapy to 

off.  Saw this am was on 2 liters but sat is 100%.  Call if questions.

## 2019-07-01 NOTE — PROGRESS NOTE
Assessment and Plan


Assessment and plan: 


Patient is a 69 year old male with hx of HTN, Tobacco dependance and a Palomares, 

presenting to the ED with complaints of 2 months of dyspnea with exertion with 

mild non specific chest pain 3/10. Patient recently arrived from [place] ([facility])

the day prior to admission. 





CTA of the chest was negative for PE but shwed a Right upper lobe mass and also 

another on the left,  pleural effusion, 


CXR significant for vascular congestion, small bilateral effusions and a 

possible infiltrate in the left base.


EKG negative for acute findings. 





Assessment and plan





NSTEMI TYPE 2: Continue work up and management per cardiology, Heparin drip 

Discontinued, nitroglycerin, carvedilol, aspirin, Lasix and lisinopril.  Echo 

Pending


Acute Respiratory failure: Improving with diuresis, Pulmonary input noted, wean 

off oxygen


HTN;


Acute Congestive Heart failure: Await Echo, CHF protocol, daily weight, low salt

diet. 


Lung lesion: per Pulm, awaiting  CT guided biopsy


Tobacco use disorder: extensive counselling on tobacco cessation


Discussed with family over the phone


Anticipate discharge following BIOPSY if adequate follow up can be arranged for 

result follow up











History


Interval history: 


Patient seen and examined reports improvement in symptoms. No new complaints, He

denies hx of TB. No overnight complaints noted








Hospitalist Physical





- Physical exam


Narrative exam: 


VITAL SIGNS:  Reviewed.    


GENERAL:  The patient appeared well nourished and normally developed, Vital 

signs as documented.


HEAD:  No signs of head trauma.


EYES:  Pupils are equal.  Extraocular motions intact.  


EARS:  Hearing grossly intact.


MOUTH:  Oropharynx is normal. 


NECK:  No adenopathy, no JVD.  


CHEST:  Chest with fine rales breath sounds bilaterally.  No wheezes, rales, or 

rhonchi.  


CARDIAC:  Regular rate and rhythm.  S1 and S2, without murmurs, gallops, or 

rubs.


VASCULAR:  No Edema.  Peripheral pulses normal and equal in all extremities.


ABDOMEN:  Soft, non tender and non distended.  No   rebound or guarding, and no 

masses palpated.   Bowel Sounds normal.


MUSCULOSKELETAL:  Good range of motion of all major joints. Extremities without 

clubbing, cyanosis or edema.  


NEUROLOGIC EXAM:  Alert and oriented x 3   No focal sensory or strength 

deficits.   Speech normal.  Follows commands.


PSYCHIATRIC:  Mood normal.


SKIN:  No rash or lesions.








- Constitutional


Vitals: 


                                        











Temp Pulse Resp BP Pulse Ox


 


 98.3 F   76   18   121/77   93 


 


 07/01/19 16:07  07/01/19 16:07  07/01/19 16:07  07/01/19 16:07  07/01/19 16:07











General appearance: Present: no acute distress, well-nourished





Results





- Labs


CBC & Chem 7: 


                                 07/01/19 04:29





                                 06/30/19 06:54


Labs: 


                             Laboratory Last Values











WBC  7.9 K/mm3 (4.5-11.0)   06/30/19  06:54    


 


RBC  3.91 M/mm3 (3.65-5.03)   06/30/19  06:54    


 


Hgb  10.9 gm/dl (11.8-15.2)  L  07/01/19  04:29    


 


Hct  32.9 % (35.5-45.6)  L  07/01/19  04:29    


 


MCV  86 fl (84-94)   06/30/19  06:54    


 


MCH  29 pg (28-32)   06/30/19  06:54    


 


MCHC  33 % (32-34)   06/30/19  06:54    


 


RDW  17.0 % (13.2-15.2)  H  06/30/19  06:54    


 


Plt Count  507 K/mm3 (140-440)  H  07/01/19  04:29    


 


Lymph % (Auto)  22.8 % (13.4-35.0)   06/28/19  20:38    


 


Mono % (Auto)  7.7 % (0.0-7.3)  H  06/28/19  20:38    


 


Eos % (Auto)  0.7 % (0.0-4.3)   06/28/19  20:38    


 


Baso % (Auto)  0.7 % (0.0-1.8)   06/28/19  20:38    


 


Lymph #  2.1 K/mm3 (1.2-5.4)   06/28/19  20:38    


 


Mono #  0.7 K/mm3 (0.0-0.8)   06/28/19  20:38    


 


Eos #  0.1 K/mm3 (0.0-0.4)   06/28/19  20:38    


 


Baso #  0.1 K/mm3 (0.0-0.1)   06/28/19  20:38    


 


Seg Neutrophils %  68.1 % (40.0-70.0)   06/28/19  20:38    


 


Seg Neutrophils #  6.2 K/mm3 (1.8-7.7)   06/28/19  20:38    


 


PT  15.0 Sec. (12.2-14.9)  H  06/29/19  07:43    


 


INR  1.21  (0.87-1.13)  H  06/29/19  07:43    


 


APTT  33.7 Sec. (24.2-36.6)   06/29/19  07:43    


 


  868.67 ng/mlDDU (0-234)  H  06/28/19  20:38    


 


Heparin Anti-Xa Level  < 0.10 U.I./ml (0.3-0.7)  L  07/01/19  13:41    


 


Sodium  138 mmol/L (137-145)   06/30/19  06:54    


 


Potassium  3.6 mmol/L (3.6-5.0)   06/30/19  06:54    


 


Chloride  96.5 mmol/L ()  L  06/30/19  06:54    


 


Carbon Dioxide  25 mmol/L (22-30)   06/30/19  06:54    


 


  20 mmol/L  06/30/19  06:54    


 


BUN  22 mg/dL (9-20)  H  06/30/19  06:54    


 


  1.3 mg/dL (0.8-1.5)   06/30/19  06:54    


 


Estimated GFR  > 60 ml/min  06/30/19  06:54    


 


  17 %  06/30/19  06:54    


 


Glucose  76 mg/dL ()   06/30/19  06:54    


 


Calcium  8.2 mg/dL (8.4-10.2)  L  06/30/19  06:54    


 


  0.90 mg/dL (0.1-1.2)   06/28/19  20:38    


 


AST  24 units/L (5-40)   06/28/19  20:38    


 


ALT  16 units/L (7-56)   06/28/19  20:38    


 


  77 units/L ()   06/28/19  20:38    


 


  185 units/L ()  H  06/29/19  14:40    


 


CK-MB (CK-2)  8.5 ng/mL (0.0-4.0)  H  06/29/19  14:40    


 


CK-MB (CK-2) Rel Index  4.5  (0-4)  H  06/29/19  14:40    


 


  0.329 ng/mL (0.00-0.029)  H* D 06/29/19  14:40    


 


NT-Pro-B Natriuret Pep  8832 pg/mL (0-900)  H  06/28/19  20:38    


 


  7.4 g/dL (6.3-8.2)   06/28/19  20:38    


 


  3.5 g/dL (3.9-5)  L  06/28/19  20:38    


 


  0.9 %  06/28/19  20:38    


 


Triglycerides  82 mg/dL (2-149)   06/28/19  20:38    


 


Cholesterol  150 mg/dL ()   06/28/19  20:38    


 


  101 mg/dL ()   06/28/19  20:38    


 


  42 mg/dL (40-59)   06/28/19  20:38    


 


  3.57 %  06/28/19  20:38    














Active Medications





- Current Medications


Current Medications: 














Generic Name Dose Route Start Last Admin





  Trade Name Freq  PRN Reason Stop Dose Admin


 


Aspirin  325 mg  06/29/19 10:00  07/01/19 09:33





  Aspirin  PO   325 mg





  QDAY JOSEFA   Administration


 


Carvedilol  3.125 mg  06/29/19 10:00  07/01/19 09:33





  Coreg  PO   3.125 mg





  BID JOSEFA   Administration


 


Furosemide  40 mg  06/29/19 10:00  07/01/19 09:33





  Lasix  IV   40 mg





  QDAY JOSEFA   Administration


 


Hydralazine HCl  10 mg  06/29/19 07:59 





  Apresoline  IV  





  Q4H PRN  





  Hypertension  


 


Levalbuterol HCl  0.63 mg  06/29/19 00:33 





  Xopenex  IH  





  Q8HRT PRN  





  Shortness Of Breath  


 


Lisinopril  5 mg  06/29/19 10:00  07/01/19 09:33





  Zestril  PO   5 mg





  DAILY JOSEFA   Administration


 


Morphine Sulfate  2 mg  06/29/19 09:44 





  Morphine  IV  





  Q6H PRN  





  Pain, Moderate (4-6)  


 


Nitroglycerin  0.5 inch  06/29/19 06:00  07/01/19 09:41





  Nitro-Bid 2%  TP   Not Given





  QIDNTG [facility]  





  Protocol  


 


Ondansetron HCl  4 mg  06/29/19 00:27 





  Zofran  IV  





  Q8H PRN  





  Nausea And Vomiting

## 2019-07-01 NOTE — PROGRESS NOTE
Assessment and Plan


Echo reviewed - EF 55-60%, septal wall hypertrophy, mod MR, mod TR, RVSP 53mmHg,

LA mod to severely dilated. 


Currently stable cardiac status. Cardiac enzyme elevation appears c/w NSTEMI 

type II. Will d/c heparin gtt as this has been infusing for 48Hr. Cont all other

present cardiac management, including IV diuresis. Pt appears to be nearing 

euvolemia. 


Follow pulmonary recs. Can consider stress test after pulmonary w/u is complete 

- stress test can even be done as OP. 





The patient has been seen in conjunction with Dr. Bynum who agrees with the 

assessment and plan of care.








- Patient Problems


(1) Lung mass


Current Visit: Yes   Status: Acute   





(2) Acute heart failure with preserved ejection fraction


Current Visit: Yes   Status: Acute   





(3) NSTEMI (non-ST elevated myocardial infarction)


Current Visit: Yes   Status: Acute   


Plan to address problem: 


type II








(4) Chest pain


Current Visit: Yes   Status: Resolved   





(5) Moderate mitral regurgitation


Current Visit: Yes   Status: Chronic   





(6) Moderate tricuspid regurgitation


Current Visit: Yes   Status: Chronic   





(7) Tobacco use


Current Visit: Yes   Status: Chronic   





Subjective


Date of service: 07/01/19


Principal diagnosis: lung mass; HF; cp


Interval history: 


pt sitting up at bedside comfortably, no current complaints. wants to know when 

he can go home. in [place] on tele with bouts of ST overnight.








Objective





                                Last Vital Signs











Temp  98.2 F   07/01/19 07:28


 


Pulse  79   07/01/19 09:41


 


Resp  20   07/01/19 07:28


 


BP  134/97   07/01/19 09:41


 


Pulse Ox  100   07/01/19 08:16

















- Physical Examination


General: No Apparent Distress


HEENT: Positive: PERRL


Neck: Positive: neck supple


Cardiac: Positive: Reg Rate and Rhythm, S1/S2


Lungs: Positive: Decreased Breath Sounds


Neuro: Positive: Grossly Intact, Other (Deferred)


Abdomen: Positive: Unremarkable


/Rectal: Other (Deferred)


Skin: Positive: Clear


Musculoskeletal: Normal Range of Motion


Extremities: Present: normal





- Labs and Meds


                                       CBC











  07/01/19 Range/Units





  04:29 


 


Hgb  10.9 L  (11.8-15.2)  gm/dl


 


Hct  32.9 L  (35.5-45.6)  %


 


Plt Count  507 H  (140-440)  K/mm3














- Imaging and Cardiology


Echo: pending





- Telemetry


EKG Rhythm: Sinus Rhythm


AV and intraventricular conduction: left bundle branch block, left posterior 

fascicular


Repolarization changes or abnormalities: nonspecific abnormality, ST segment, 

and/or T wave

## 2019-07-02 VITALS — SYSTOLIC BLOOD PRESSURE: 138 MMHG | DIASTOLIC BLOOD PRESSURE: 87 MMHG

## 2019-07-02 LAB
BUN SERPL-MCNC: 32 MG/DL (ref 9–20)
BUN/CREAT SERPL: 25 %
CALCIUM SERPL-MCNC: 9 MG/DL (ref 8.4–10.2)
HEMOLYSIS INDEX: 1

## 2019-07-02 RX ADMIN — CARVEDILOL SCH MG: 3.12 TABLET, FILM COATED ORAL at 10:52

## 2019-07-02 RX ADMIN — ASPIRIN SCH MG: 325 TABLET ORAL at 10:52

## 2019-07-02 RX ADMIN — LISINOPRIL SCH MG: 5 TABLET ORAL at 10:53

## 2019-07-02 RX ADMIN — NITROGLYCERIN SCH INCH: 20 OINTMENT TOPICAL at 10:51

## 2019-07-02 RX ADMIN — NITROGLYCERIN SCH: 20 OINTMENT TOPICAL at 17:41

## 2019-07-02 RX ADMIN — FUROSEMIDE SCH MG: 10 INJECTION, SOLUTION INTRAVENOUS at 10:49

## 2019-07-02 RX ADMIN — NITROGLYCERIN SCH INCH: 20 OINTMENT TOPICAL at 06:35

## 2019-07-02 RX ADMIN — NITROGLYCERIN SCH INCH: 20 OINTMENT TOPICAL at 17:43

## 2019-07-02 NOTE — PROGRESS NOTE
Assessment and Plan


Currently stable cardiac status. Pt appears to be nearing euvolemia, will d/c IV

lasix. Pt for CT guided biopsy today per his son. 


Can consider stress test after pulmonary w/u is complete - stress test can even 

be done as OP. 





The patient has been seen in conjunction with Dr. Bynum who agrees with the 

assessment and plan of care.








- Patient Problems


(1) Lung mass


Current Visit: Yes   Status: Acute   





(2) Acute heart failure with preserved ejection fraction


Current Visit: Yes   Status: Acute   





(3) NSTEMI (non-ST elevated myocardial infarction)


Current Visit: Yes   Status: Acute   


Plan to address problem: 


type II








(4) Chest pain


Current Visit: Yes   Status: Resolved   





(5) Moderate mitral regurgitation


Current Visit: Yes   Status: Chronic   





(6) Moderate tricuspid regurgitation


Current Visit: Yes   Status: Chronic   





(7) Tobacco use


Current Visit: Yes   Status: Chronic   





Subjective


Date of service: 07/02/19


Principal diagnosis: lung mass; HF; cp


Interval history: 


pt sitting up at bedside comfortably, no current complaints. son at bedside.








Objective





                                Last Vital Signs











Temp  98.5 F   07/02/19 07:19


 


Pulse  75   07/02/19 10:53


 


Resp  20   07/02/19 07:19


 


BP  118/74   07/02/19 10:53


 


Pulse Ox  100   07/02/19 07:38

















- Physical Examination


General: No Apparent Distress


HEENT: Positive: PERRL


Neck: Positive: neck supple


Cardiac: Positive: Reg Rate and Rhythm, S1/S2


Lungs: Positive: Decreased Breath Sounds


Neuro: Positive: Grossly Intact


Abdomen: Positive: Unremarkable


Skin: Positive: Clear


Musculoskeletal: Normal Range of Motion


Extremities: Present: normal





- Labs and Meds


                          Comprehensive Metabolic Panel











  07/02/19 Range/Units





  04:47 


 


Sodium  140  (137-145)  mmol/L


 


Potassium  4.1  (3.6-5.0)  mmol/L


 


Chloride  98.2  ()  mmol/L


 


Carbon Dioxide  28  (22-30)  mmol/L


 


BUN  32 H  (9-20)  mg/dL


 


Creatinine  1.3  (0.8-1.5)  mg/dL


 


Glucose  95  ()  mg/dL


 


Calcium  9.0  (8.4-10.2)  mg/dL














- Imaging and Cardiology


Echo: report reviewed (EF 55-60%, septal wall hypertrophy, mod MR, mod TR, RVSP 

53mmHg, LA mod to severely dilated. )


AV and intraventricular conduction: left bundle branch block, left posterior 

fascicular


Repolarization changes or abnormalities: nonspecific abnormality, ST segment, 

and/or T wave

## 2019-07-02 NOTE — DISCHARGE SUMMARY
Providers





- Providers


Date of Admission: 


06/29/19 00:17





Date of discharge: 07/02/19


Attending physician: 


JUDITH PABLO





                                        





06/29/19 06:00


Consult to Physician [CONS] Routine 


   Comment: 


   Consulting Provider: TOMÁS BOWLING


   Physician Instructions: 


   Reason For Exam: CHF WITH ELEVATED TROPONIN LEVEL





07/02/19 09:11


Physical Therapy Evaluation and Treat [CONS] Routine 


   Comment: 


   Reason For Exam: Weakness











Primary care physician: 


[facility], MD








Hospitalization


Condition: Stable


Hospital course: 


Patient is a 69 year old man who speaks a ([place]) Western  dialect

 (Lamil, son at bedside was the ) with a history of HTN and Tobacco 

dependance who presented to ED with BRITTON and mild non specific chest pains.  

3/10. Patient recently arrived from [place] ([facility]) the day prior to admission. 

He is a [place] in his [place]. He was scheduled for CT guided biopsy of the RUL 

mass but was canceled by Dr. Mata, the Radialogist, because it appears to be a

 chronic granuloma. D/W Dr. Dubose who agrees.





* CTA of the chest was negative for PE but shwed a Right upper lobe mass and 

  also another on the left, pleural effusion, 


* CXR significant for vascular congestion, small bilateral effusions and a 

  possible infiltrate in the left base.


* EKG negative for acute findings. 





Discharge Diagnoses: 


NSTEMI TYPE 2: d/w Cardiology, ok to discharge, stress test as outpatient


Acute Respiratory failure: Improving with diuresis, Pulmonary input noted, wean 

off oxygen


Hypertension with heart disease


Acute Diastolic Heart failure, estimated EF 50-55%


Lung lesion most likely chronic Granulamatous disease: outpt pulm 


Tobacco use disorder: extensive counselling on tobacco cessation





Disposition: DC-01 TO HOME OR SELFCARE


Time spent for discharge: 35 minutes





Core Measure Documentation





- Palliative Care


Palliative Care/ Comfort Measures: Not Applicable





- Core Measures


Any of the following diagnoses?: acute MI





- VTE Discharge Requirements


Deep Vein Thrombosis/Pulmonary Embolism Present on Admission: No


Has pt received <5 days of overlap therapy or INR<2.0: No


Anticoagulant overlap therapy prescribed at discharge: No


Contraindication No Overlap Therapy order at DC: Not Indicated





- Acute MI Discharge Requirements


Aspirin at discharge: Yes


ACE/ARB for LVSD if EF <40%: Yes


Beta blocker at discharge: Yes


Statin for LDL = or >100 mg/dl on DC: Yes





Exam





- Physical Exam


Narrative exam: 


Gen: WDWN, NAD, Awake, Alert, Orientated 


HEENT: NCAT, EOMI, PERRL, OP Clear 


Neck: supple, no adenopathy, no thyromegaly, no JVD 


CVS/Heart: RRR, normal S1S2, pulses present bilaterally 


Chest/Lungs: CTA B, Symmetrical chest expansion, good air entry bilaterally 


GI/Abdomen: soft, NTND, good bowel sounds, no guarding or rebound 


/Bladder: no suprapubic tenderness, no CVA or paraspinal tenderness 


Extermity/Skin: no c/c/e, no obvious rash 


MSK: FROM x 4 


Neuro: CN 2-12 grossly intact, no new focal deficits 


Psych: calm 





- Constitutional


Vitals: 


                                        











Temp Pulse Resp BP Pulse Ox


 


 97.8 F   76   20   99/70   99 


 


 07/02/19 13:12  07/02/19 13:12  07/02/19 13:12  07/02/19 13:12  07/02/19 13:12














Plan


Activity: other (no strenous activity unless cleared by Cardiologist)


Diet: low salt


Additional Instructions: See Dr. Bowling to schedule Cardiac Stress test in the 

office


Follow up with: 


[facility], MD [Primary Care Provider] - 7 Days


TOMÁS BOWLING MD [Staff Physician] - 7 Days


Prescriptions: 


AtorvaSTATin [Lipitor] 20 mg PO QHS #30 tab


Aspirin 325 mg PO QDAY #30 tablet


Carvedilol [Coreg] 3.125 mg PO BID #60 tablet


Albuterol Sulfate [Proventil Hfa] 2 puff IH Q4H PRN #1 unit


 PRN Reason: Shortness Of Breath


Lisinopril [Zestril TAB] 5 mg PO DAILY #30 tablet

## 2019-09-07 ENCOUNTER — HOSPITAL ENCOUNTER (EMERGENCY)
Dept: HOSPITAL 5 - ED | Age: 69
Discharge: HOME | End: 2019-09-07
Payer: COMMERCIAL

## 2019-09-07 VITALS — SYSTOLIC BLOOD PRESSURE: 146 MMHG | DIASTOLIC BLOOD PRESSURE: 94 MMHG

## 2019-09-07 DIAGNOSIS — J44.1: Primary | ICD-10-CM

## 2019-09-07 DIAGNOSIS — J98.4: ICD-10-CM

## 2019-09-07 DIAGNOSIS — Z87.891: ICD-10-CM

## 2019-09-07 DIAGNOSIS — I10: ICD-10-CM

## 2019-09-07 DIAGNOSIS — Z79.899: ICD-10-CM

## 2019-09-07 LAB
BASOPHILS # (AUTO): 0.1 K/MM3 (ref 0–0.1)
BASOPHILS NFR BLD AUTO: 1.4 % (ref 0–1.8)
BUN SERPL-MCNC: 23 MG/DL (ref 9–20)
BUN/CREAT SERPL: 21 %
CALCIUM SERPL-MCNC: 8.9 MG/DL (ref 8.4–10.2)
EOSINOPHIL # BLD AUTO: 0.3 K/MM3 (ref 0–0.4)
EOSINOPHIL NFR BLD AUTO: 4.4 % (ref 0–4.3)
HCT VFR BLD CALC: 34.8 % (ref 35.5–45.6)
HEMOLYSIS INDEX: 5
HGB BLD-MCNC: 11.2 GM/DL (ref 11.8–15.2)
LYMPHOCYTES # BLD AUTO: 1.4 K/MM3 (ref 1.2–5.4)
LYMPHOCYTES NFR BLD AUTO: 23.2 % (ref 13.4–35)
MCHC RBC AUTO-ENTMCNC: 32 % (ref 32–34)
MCV RBC AUTO: 84 FL (ref 84–94)
MONOCYTES # (AUTO): 0.6 K/MM3 (ref 0–0.8)
MONOCYTES % (AUTO): 10.2 % (ref 0–7.3)
PLATELET # BLD: 251 K/MM3 (ref 140–440)
RBC # BLD AUTO: 4.17 M/MM3 (ref 3.65–5.03)

## 2019-09-07 PROCEDURE — 82803 BLOOD GASES ANY COMBINATION: CPT

## 2019-09-07 PROCEDURE — 71046 X-RAY EXAM CHEST 2 VIEWS: CPT

## 2019-09-07 PROCEDURE — 99285 EMERGENCY DEPT VISIT HI MDM: CPT

## 2019-09-07 PROCEDURE — 36415 COLL VENOUS BLD VENIPUNCTURE: CPT

## 2019-09-07 PROCEDURE — 93010 ELECTROCARDIOGRAM REPORT: CPT

## 2019-09-07 PROCEDURE — 94644 CONT INHLJ TX 1ST HOUR: CPT

## 2019-09-07 PROCEDURE — 96375 TX/PRO/DX INJ NEW DRUG ADDON: CPT

## 2019-09-07 PROCEDURE — 85025 COMPLETE CBC W/AUTO DIFF WBC: CPT

## 2019-09-07 PROCEDURE — 93005 ELECTROCARDIOGRAM TRACING: CPT

## 2019-09-07 PROCEDURE — 80048 BASIC METABOLIC PNL TOTAL CA: CPT

## 2019-09-07 PROCEDURE — 96374 THER/PROPH/DIAG INJ IV PUSH: CPT

## 2019-09-07 PROCEDURE — 84484 ASSAY OF TROPONIN QUANT: CPT

## 2019-09-07 PROCEDURE — 94640 AIRWAY INHALATION TREATMENT: CPT

## 2019-09-07 NOTE — EMERGENCY DEPARTMENT REPORT
ED Shortness of Breath HPI





- General


Chief Complaint: Chest Pain


Stated Complaint: CHEST PAIN/SOB


Time Seen by Provider: 09/07/19 11:15


Source: patient, family


Mode of arrival: Ambulatory


Limitations: Language Barrier





- History of Present Illness


Initial Comments: 





69-year-old male with a past medical history hypertension, CHF, remote smoking 

history, and lung mass diagnosed on previous admission presents to the hospital 

complaining of shortness of breath progressively worsening times one week.  

Symptoms symptoms are worse with exertion.  Dry cough for several days that is 

worse at night.  Patient complains of discomfort across bilateral chest with 

breathing.  No reports of leg edema, fever, recent travel, or calf tenderness.  

Patient was admitted here in July or shortness of breath and similar symptoms.  

He had a positive troponin but stress test was planned as outpatient.  His 

shortness of breath improved with diuresis and patient was weaned off oxygen.  

Patient had acute diastolic heart failure would estimate EF of 50-55%.  The 

patient also had a right upper lobe lung mass, left lung mass, and pleural 

effusion.  Findings were thought to be due to chronic granulomatous disease and 

outpatient pulmonology follow-up recommended. Was to follow up with [facility] cardiology cardiologist Dr. Easton and to follow up with outpatient pul

monology.  Patient has been compliant with his medications.











- Related Data


                                  Previous Rx's











 Medication  Instructions  Recorded  Last Taken  Type


 


Albuterol Sulfate [Proventil Hfa] 2 puff IH Q4H PRN #1 unit 07/02/19 Unknown Rx


 


Aspirin 325 mg PO QDAY #30 tablet 07/02/19 Unknown Rx


 


AtorvaSTATin [Lipitor] 20 mg PO QHS #30 tab 07/02/19 Unknown Rx


 


Carvedilol [Coreg] 3.125 mg PO BID #60 tablet 07/02/19 Unknown Rx


 


Lisinopril [Zestril TAB] 5 mg PO DAILY #30 tablet 07/02/19 Unknown Rx


 


Inhaler, Assist Devices [Space 1 each MC PRN PRN #1 spacer 09/07/19 Unknown Rx





Chamber Plus]    


 


Prednisone [predniSONE 10 mg 10 mg PO .TAPER #1 tab.ds.pk 09/07/19 Unknown Rx





(6-Day Pack, 21 Tabs)]    











                                    Allergies











Allergy/AdvReac Type Severity Reaction Status Date / Time


 


No Known Allergies Allergy   Unverified 06/28/19 14:40














ED Review of Systems


ROS: 


Stated complaint: CHEST PAIN/SOB


Other details as noted in HPI





Comment: All other systems reviewed and negative





ED Past Medical Hx





- Past Medical History


Previous Medical History?: Yes


Hx Hypertension: Yes





- Surgical History


Past Surgical History?: Yes


Additional Surgical History: hernia





- Social History


Smoking Status: Former Smoker


Substance Use Type: None





- Medications


Home Medications: 


                                Home Medications











 Medication  Instructions  Recorded  Confirmed  Last Taken  Type


 


Albuterol Sulfate [Proventil Hfa] 2 puff IH Q4H PRN #1 unit 07/02/19  Unknown Rx


 


Aspirin 325 mg PO QDAY #30 tablet 07/02/19  Unknown Rx


 


AtorvaSTATin [Lipitor] 20 mg PO QHS #30 tab 07/02/19  Unknown Rx


 


Carvedilol [Coreg] 3.125 mg PO BID #60 tablet 07/02/19  Unknown Rx


 


Lisinopril [Zestril TAB] 5 mg PO DAILY #30 tablet 07/02/19  Unknown Rx


 


Inhaler, Assist Devices [Space 1 each MC PRN PRN #1 spacer 09/07/19  Unknown Rx





Chamber Plus]     


 


Prednisone [predniSONE 10 mg 10 mg PO .TAPER #1 tab.ds.pk 09/07/19  Unknown Rx





(6-Day Pack, 21 Tabs)]     














ED Physical Exam





- General


Limitations: Language Barrier





- Other


Other exam information: 





Normal: No acute distress


Head: Atraumatic


Eyes: Normal appearance


ENT: Moist mucous membranes


Neck: Normal appearance, no midline cervical tenderness, no meningismus


Chest: Diminished breath sounds bilaterally without wheezes, rales, crackles


Cardiovascular: Regular rate and rhythm, systolic murmur noted at the apex


Abdomen: Soft, nontender, nondistended, no rebound or guarding, normal bowel 

sounds


Back: Normal inspection 


Extremity: Normal appearance, full range of motion, no leg edema, no calf 

tenderness


Neuro: Alert and oriented 3, speech normal, no gross motor sensory deficit


Psych: Appropriate


Skin: No rash





ED Course


                                   Vital Signs











  09/07/19 09/07/19 09/07/19





  10:36 11:52 12:47


 


Temperature 98.2 F  


 


Pulse Rate 72  


 


Pulse Rate [  72 65





Anterior   





Bilateral]   


 


Respiratory 18  





Rate   


 


Respiratory  16 16





Rate [Anterior   





Bilateral]   


 


Blood Pressure 130/92  


 


Blood Pressure   





[Left]   


 


O2 Sat by Pulse   





Oximetry   














  09/07/19





  12:58


 


Temperature 


 


Pulse Rate 74


 


Pulse Rate [ 





Anterior 





Bilateral] 


 


Respiratory 20





Rate 


 


Respiratory 





Rate [Anterior 





Bilateral] 


 


Blood Pressure 


 


Blood Pressure 136/101





[Left] 


 


O2 Sat by Pulse 98





Oximetry 














ED Medical Decision Making





- Lab Data


Result diagrams: 


                                 09/07/19 10:51





                                 09/07/19 10:51








                                   Lab Results











  09/07/19 09/07/19 09/07/19 Range/Units





  10:51 10:51 13:51 


 


WBC  6.2    (4.5-11.0)  K/mm3


 


RBC  4.17    (3.65-5.03)  M/mm3


 


Hgb  11.2 L    (11.8-15.2)  gm/dl


 


Hct  34.8 L    (35.5-45.6)  %


 


MCV  84    (84-94)  fl


 


MCH  27 L    (28-32)  pg


 


MCHC  32    (32-34)  %


 


RDW  16.7 H    (13.2-15.2)  %


 


Plt Count  251    (140-440)  K/mm3


 


Lymph % (Auto)  23.2    (13.4-35.0)  %


 


Mono % (Auto)  10.2 H    (0.0-7.3)  %


 


Eos % (Auto)  4.4 H    (0.0-4.3)  %


 


Baso % (Auto)  1.4    (0.0-1.8)  %


 


Lymph #  1.4    (1.2-5.4)  K/mm3


 


Mono #  0.6    (0.0-0.8)  K/mm3


 


Eos #  0.3    (0.0-0.4)  K/mm3


 


Baso #  0.1    (0.0-0.1)  K/mm3


 


Seg Neutrophils %  60.8    (40.0-70.0)  %


 


Seg Neutrophils #  3.7    (1.8-7.7)  K/mm3


 


POC ABG pH     (7.35-7.45)  


 


POC ABG pO2     ()  


 


POC ABG HCO3     (22-26 mml/L)  


 


POC ABG Total CO2     (23-27mmol/L)  


 


POC ABG O2 Sat     


 


POC ABG Base Excess     ((-2) - (+3)mmol/L)  


 


FiO2     %


 


Sodium   143   (137-145)  mmol/L


 


Potassium   4.3   (3.6-5.0)  mmol/L


 


Chloride   106.7   ()  mmol/L


 


Carbon Dioxide   21 L   (22-30)  mmol/L


 


Anion Gap   20   mmol/L


 


BUN   23 H   (9-20)  mg/dL


 


Creatinine   1.1   (0.8-1.5)  mg/dL


 


Estimated GFR   > 60   ml/min


 


BUN/Creatinine Ratio   21   %


 


Glucose   113 H   ()  mg/dL


 


Calcium   8.9   (8.4-10.2)  mg/dL


 


Troponin T   < 0.010  < 0.010  (0.00-0.029)  ng/mL














  09/07/19 Range/Units





  14:44 


 


WBC   (4.5-11.0)  K/mm3


 


RBC   (3.65-5.03)  M/mm3


 


Hgb   (11.8-15.2)  gm/dl


 


Hct   (35.5-45.6)  %


 


MCV   (84-94)  fl


 


MCH   (28-32)  pg


 


MCHC   (32-34)  %


 


RDW   (13.2-15.2)  %


 


Plt Count   (140-440)  K/mm3


 


Lymph % (Auto)   (13.4-35.0)  %


 


Mono % (Auto)   (0.0-7.3)  %


 


Eos % (Auto)   (0.0-4.3)  %


 


Baso % (Auto)   (0.0-1.8)  %


 


Lymph #   (1.2-5.4)  K/mm3


 


Mono #   (0.0-0.8)  K/mm3


 


Eos #   (0.0-0.4)  K/mm3


 


Baso #   (0.0-0.1)  K/mm3


 


Seg Neutrophils %   (40.0-70.0)  %


 


Seg Neutrophils #   (1.8-7.7)  K/mm3


 


POC ABG pH  7.491 H  (7.35-7.45)  


 


POC ABG pO2  87  ()  


 


POC ABG HCO3  21.0  (22-26 mml/L)  


 


POC ABG Total CO2  22  (23-27mmol/L)  


 


POC ABG O2 Sat  98  


 


POC ABG Base Excess  -2  ((-2) - (+3)mmol/L)  


 


FiO2  21  %


 


Sodium   (137-145)  mmol/L


 


Potassium   (3.6-5.0)  mmol/L


 


Chloride   ()  mmol/L


 


Carbon Dioxide   (22-30)  mmol/L


 


Anion Gap   mmol/L


 


BUN   (9-20)  mg/dL


 


Creatinine   (0.8-1.5)  mg/dL


 


Estimated GFR   ml/min


 


BUN/Creatinine Ratio   %


 


Glucose   ()  mg/dL


 


Calcium   (8.4-10.2)  mg/dL


 


Troponin T   (0.00-0.029)  ng/mL








abg co2 27.5





- EKG Data


-: EKG Interpreted by Me (RBBB, LPFB)


EKG shows normal: sinus rhythm, axis (qrs 112), QRS complexes (qrsd 136), ST-T 

waves





- EKG Data


When compared to previous EKG there are: no significant change





- Radiology Data


Radiology results: report reviewed








CHEST 2 VIEW, 9/7/2019 11:05 AM





INDICATION: Chest pain





COMPARISON: Chest radiograph, 6/28/2019





FINDINGS:





SUPPORT DEVICES: None


HEART: The cardiac silhouette appears normal in size.


LUNGS/PLEURA: Diffuse bilateral mixed interstitial and airspace disease is 

noted, similar to the


previous study. The lungs are hyperinflated with increased AP dimension of the 

chest.





ADDITIONAL FINDINGS: No additional acute findings.





IMPRESSION:


1. Mixed bilateral interstitial and airspace disease. Findings are nonspecific 

but may suggest an


infectious process or pulmonary edema.








- Medical Decision Making





Assessment a similar to previous study.  Patient had hyperinflation and dyspnea 

on exertion.  Symptoms improved with steroids and bronchodilators in the ED.  

Patient reports feeling better.  He also received 1 dose of IV Lasix with 

diuresis.  Patient has a mixed clinical patient suggestive of chronic lung 

disease with possible CHF.  Patient ambulated in the ED with some mild 

desaturation however, ABG on room air shows adequate oxygenation and therefore 

no need for home O2 therapy.  Patient will be provided a spacer to help with 

bronchodilator dosing.  Short course of steroids will also be provided.  

Continued follow-up will cardiologist and pulmonology will be encouraged.  

Murmur auscultated patient had an echocardiogram in July showing moderate MR and

 TR. pt states he feels better with treatment and feels well enough to go home. 





- Differential Diagnosis


COPD, CHF, pneumonia, bronchitis, PE, MI


Critical Care Time: No


Critical care attestation.: 


If time is entered above; I have spent that time in minutes in the direct care 

of this critically ill patient, excluding procedure time.








ED Disposition


Clinical Impression: 


 COPD exacerbation, Chronic lung disease





Disposition: DC-01 TO HOME OR SELFCARE


Is pt being admited?: No


Does the pt Need Aspirin: No


Condition: Stable


Instructions:  Chronic Obstructive Pulmonary Disease (ED)


Additional Instructions: 


Take your medications as prescribed.  Follow-up with your doctor or the 

clinic/doctor provided.  Return if symptoms worsen.


Prescriptions: 


Prednisone [predniSONE 10 mg (6-Day Pack, 21 Tabs)] 10 mg PO .TAPER #1 tab.ds.pk


Inhaler, Assist Devices [Space Chamber Plus] 1 each MC PRN PRN #1 spacer


 PRN Reason: Wheezing


Referrals: 


[facility],MD [Primary Care Provider] - 3-5 Days


[facility] [Provider Group] - 3-5 Days


[facility] [Provider Group] - 3-5 Days (cardiology)


SHELBY MORALES MD [Staff Physician] - 3-5 Days (Lung specialist)


Time of Disposition: 15:15

## 2019-09-07 NOTE — XRAY REPORT
CHEST 2 VIEW, 9/7/2019 11:05 AM 



INDICATION: Chest pain



COMPARISON: Chest radiograph, 6/28/2019



FINDINGS:



SUPPORT DEVICES: None

HEART: The cardiac silhouette appears normal in size.

LUNGS/PLEURA: Diffuse bilateral mixed interstitial and airspace disease is noted, similar to the prev
ious study. The lungs are hyperinflated with increased AP dimension of the chest.



ADDITIONAL FINDINGS: No additional acute findings.



IMPRESSION:

1. Mixed bilateral interstitial and airspace disease. Findings are nonspecific but may suggest an inf
ectious process or pulmonary edema.



Signer Name: Jeniffer Loredo MD 

Signed: 9/7/2019 11:17 AM

 Workstation Name: [id number]